# Patient Record
Sex: MALE | Race: WHITE | Employment: FULL TIME | URBAN - METROPOLITAN AREA
[De-identification: names, ages, dates, MRNs, and addresses within clinical notes are randomized per-mention and may not be internally consistent; named-entity substitution may affect disease eponyms.]

---

## 2023-11-21 ENCOUNTER — HOSPITAL ENCOUNTER (EMERGENCY)
Age: 38
Discharge: HOME | End: 2023-11-21
Attending: EMERGENCY MEDICINE
Payer: COMMERCIAL

## 2023-11-21 ENCOUNTER — APPOINTMENT (OUTPATIENT)
Dept: CT IMAGING | Age: 38
End: 2023-11-21
Payer: COMMERCIAL

## 2023-11-21 VITALS
OXYGEN SATURATION: 97 % | BODY MASS INDEX: 38.6 KG/M2 | HEART RATE: 69 BPM | WEIGHT: 285 LBS | SYSTOLIC BLOOD PRESSURE: 120 MMHG | RESPIRATION RATE: 18 BRPM | TEMPERATURE: 98.7 F | DIASTOLIC BLOOD PRESSURE: 90 MMHG | HEIGHT: 72 IN

## 2023-11-21 DIAGNOSIS — H53.8 BLURRED VISION, LEFT EYE: Primary | ICD-10-CM

## 2023-11-21 LAB
CHP ED QC CHECK: YES
GLUCOSE BLD-MCNC: 97 MG/DL
GLUCOSE BLD-MCNC: 97 MG/DL (ref 70–99)

## 2023-11-21 PROCEDURE — 70450 CT HEAD/BRAIN W/O DYE: CPT

## 2023-11-21 PROCEDURE — 99284 EMERGENCY DEPT VISIT MOD MDM: CPT

## 2023-11-21 PROCEDURE — 70480 CT ORBIT/EAR/FOSSA W/O DYE: CPT

## 2023-11-21 PROCEDURE — 82962 GLUCOSE BLOOD TEST: CPT

## 2023-11-21 PROCEDURE — 6370000000 HC RX 637 (ALT 250 FOR IP): Performed by: EMERGENCY MEDICINE

## 2023-11-21 RX ORDER — ONDANSETRON 4 MG/1
4 TABLET, FILM COATED ORAL EVERY 8 HOURS PRN
COMMUNITY
Start: 2023-09-27

## 2023-11-21 RX ORDER — TETRACAINE HYDROCHLORIDE 5 MG/ML
2 SOLUTION OPHTHALMIC ONCE
Status: COMPLETED | OUTPATIENT
Start: 2023-11-21 | End: 2023-11-21

## 2023-11-21 RX ORDER — LOSARTAN POTASSIUM 25 MG/1
25 TABLET ORAL DAILY
COMMUNITY
Start: 2023-11-02

## 2023-11-21 RX ORDER — ATORVASTATIN CALCIUM 40 MG/1
40 TABLET, FILM COATED ORAL NIGHTLY
COMMUNITY
Start: 2023-08-28

## 2023-11-21 RX ORDER — PROPRANOLOL HYDROCHLORIDE 20 MG/1
20 TABLET ORAL 2 TIMES DAILY
COMMUNITY
Start: 2023-08-28

## 2023-11-21 RX ADMIN — FLUORESCEIN SODIUM 1 MG: 1 STRIP OPHTHALMIC at 19:51

## 2023-11-21 RX ADMIN — TETRACAINE HYDROCHLORIDE 2 DROP: 5 SOLUTION OPHTHALMIC at 19:51

## 2023-11-21 ASSESSMENT — PAIN - FUNCTIONAL ASSESSMENT: PAIN_FUNCTIONAL_ASSESSMENT: NONE - DENIES PAIN

## 2023-11-21 ASSESSMENT — VISUAL ACUITY
OS: 20/40
OU: 20/13
OD: 20/13

## 2023-11-22 NOTE — ED NOTES
Pt verbalized understanding of discharge instructions and follow-up care, denies any questions or concerns at this time. No new prescriptions provided; pt encouraged to return to the ED for any new or worsening symptoms.      Satya Enrique RN  11/21/23 4960 - - -

## 2023-11-22 NOTE — DISCHARGE INSTRUCTIONS
Your CT scans today were nonacute. Please follow-up promptly with ophthalmology or optometry for further evaluation and management. If you develop any worsening or concerning symptoms, please seek immediate medical attention.

## 2023-11-22 NOTE — ED PROVIDER NOTES
recently. He is on Jayda Skiff as well as antihypertensives. He denies any other infectious symptoms. No cough or fevers. No nasal congestion. Denies any sensation of a curtain coming down over his or loss of peripheral vision. Not having any floaters. Denies any focal logical deficits. No changes in speech or hearing or trouble swallowing. No focal weakness or paresthesias. He reports initially going to urgent care for symptoms and was directed to come to the emergency department for further evaluation. He presents with elevated diastolic blood pressure. Vital signs otherwise unremarkable. He is afebrile. No tachycardia. Respirations are within normal limits. Ox saturations are in the high 90s on room air. Fingerstick blood sugar was 97. On gross exam eye appears overall unremarkable. Is no conjunctival injection. No chemosis. Extraocular movements intact without pain. Pupils are equal and reactive. Did obtain a CT head and CT orbits. Those are both unremarkable. Results discussed with patient. I did numb the eye with tetracaine and stained with fluorescein. Examined under slit lamp. I do not see any findings that seem consistent with something like a acute angle closure glaucoma. Does not seem apical symptoms consistent with on the left retina detachment. He has not had any visual field loss or monocular loss of vision. No peripheral vision loss is. Denies sensation of a curtain coming over his eye. I discussed with him that symptoms could be due to something like a small vitreous hemorrhage. Do recommend prompt follow-up with ophthalmology. He will follow-up with ophthalmology in the morning. He is given referral to ophthalmology. We did discuss strict return precautions for worsening concerning symptoms. He is discharged, ambulatory, stable condition, with strict return precautions. -  Patient seen and evaluated in the emergency department.   -  Triage and

## 2024-01-28 ENCOUNTER — HOSPITAL ENCOUNTER (EMERGENCY)
Age: 39
Discharge: HOME | End: 2024-01-28
Attending: EMERGENCY MEDICINE
Payer: COMMERCIAL

## 2024-01-28 ENCOUNTER — APPOINTMENT (OUTPATIENT)
Dept: CT IMAGING | Age: 39
End: 2024-01-28
Payer: COMMERCIAL

## 2024-01-28 VITALS
RESPIRATION RATE: 19 BRPM | TEMPERATURE: 97.9 F | OXYGEN SATURATION: 99 % | WEIGHT: 260 LBS | DIASTOLIC BLOOD PRESSURE: 84 MMHG | SYSTOLIC BLOOD PRESSURE: 138 MMHG | HEART RATE: 75 BPM | BODY MASS INDEX: 35.26 KG/M2

## 2024-01-28 DIAGNOSIS — R42 DIZZINESS: Primary | ICD-10-CM

## 2024-01-28 DIAGNOSIS — E04.1 THYROID NODULE: ICD-10-CM

## 2024-01-28 LAB
ANION GAP SERPL CALCULATED.3IONS-SCNC: 13 MMOL/L (ref 7–16)
BASOPHILS ABSOLUTE: 0.1 K/CU MM
BASOPHILS RELATIVE PERCENT: 0.4 % (ref 0–1)
BUN SERPL-MCNC: 11 MG/DL (ref 6–23)
CALCIUM SERPL-MCNC: 9.2 MG/DL (ref 8.3–10.6)
CHLORIDE BLD-SCNC: 102 MMOL/L (ref 99–110)
CO2: 26 MMOL/L (ref 21–32)
CREAT SERPL-MCNC: 0.9 MG/DL (ref 0.9–1.3)
DIFFERENTIAL TYPE: ABNORMAL
EOSINOPHILS ABSOLUTE: 0.3 K/CU MM
EOSINOPHILS RELATIVE PERCENT: 2.2 % (ref 0–3)
GFR SERPL CREATININE-BSD FRML MDRD: >60 ML/MIN/1.73M2
GLUCOSE SERPL-MCNC: 113 MG/DL (ref 70–99)
HCT VFR BLD CALC: 44.2 % (ref 42–52)
HEMOGLOBIN: 14.1 GM/DL (ref 13.5–18)
IMMATURE NEUTROPHIL %: 0.2 % (ref 0–0.43)
LYMPHOCYTES ABSOLUTE: 2.8 K/CU MM
LYMPHOCYTES RELATIVE PERCENT: 23 % (ref 24–44)
MCH RBC QN AUTO: 27.3 PG (ref 27–31)
MCHC RBC AUTO-ENTMCNC: 31.9 % (ref 32–36)
MCV RBC AUTO: 85.7 FL (ref 78–100)
MONOCYTES ABSOLUTE: 0.7 K/CU MM
MONOCYTES RELATIVE PERCENT: 5.5 % (ref 0–4)
PDW BLD-RTO: 13.8 % (ref 11.7–14.9)
PLATELET # BLD: 329 K/CU MM (ref 140–440)
PMV BLD AUTO: 10.3 FL (ref 7.5–11.1)
POTASSIUM SERPL-SCNC: 3.7 MMOL/L (ref 3.5–5.1)
RBC # BLD: 5.16 M/CU MM (ref 4.6–6.2)
SEGMENTED NEUTROPHILS ABSOLUTE COUNT: 8.3 K/CU MM
SEGMENTED NEUTROPHILS RELATIVE PERCENT: 68.7 % (ref 36–66)
SODIUM BLD-SCNC: 141 MMOL/L (ref 135–145)
TOTAL IMMATURE NEUTOROPHIL: 0.03 K/CU MM
WBC # BLD: 12 K/CU MM (ref 4–10.5)

## 2024-01-28 PROCEDURE — 85025 COMPLETE CBC W/AUTO DIFF WBC: CPT

## 2024-01-28 PROCEDURE — 99285 EMERGENCY DEPT VISIT HI MDM: CPT

## 2024-01-28 PROCEDURE — 2580000003 HC RX 258: Performed by: EMERGENCY MEDICINE

## 2024-01-28 PROCEDURE — 6360000004 HC RX CONTRAST MEDICATION: Performed by: EMERGENCY MEDICINE

## 2024-01-28 PROCEDURE — 70498 CT ANGIOGRAPHY NECK: CPT

## 2024-01-28 PROCEDURE — 6370000000 HC RX 637 (ALT 250 FOR IP): Performed by: EMERGENCY MEDICINE

## 2024-01-28 PROCEDURE — 93005 ELECTROCARDIOGRAM TRACING: CPT | Performed by: EMERGENCY MEDICINE

## 2024-01-28 PROCEDURE — 70450 CT HEAD/BRAIN W/O DYE: CPT

## 2024-01-28 PROCEDURE — 96360 HYDRATION IV INFUSION INIT: CPT

## 2024-01-28 PROCEDURE — 80048 BASIC METABOLIC PNL TOTAL CA: CPT

## 2024-01-28 RX ORDER — AZITHROMYCIN 250 MG/1
TABLET, FILM COATED ORAL
COMMUNITY
Start: 2023-09-26

## 2024-01-28 RX ORDER — MECLIZINE HCL 12.5 MG/1
25 TABLET ORAL ONCE
Status: COMPLETED | OUTPATIENT
Start: 2024-01-28 | End: 2024-01-28

## 2024-01-28 RX ORDER — LISINOPRIL 5 MG/1
5 TABLET ORAL DAILY
COMMUNITY
Start: 2023-02-06

## 2024-01-28 RX ORDER — 0.9 % SODIUM CHLORIDE 0.9 %
1000 INTRAVENOUS SOLUTION INTRAVENOUS ONCE
Status: COMPLETED | OUTPATIENT
Start: 2024-01-28 | End: 2024-01-28

## 2024-01-28 RX ORDER — SILDENAFIL 50 MG/1
50 TABLET, FILM COATED ORAL DAILY PRN
COMMUNITY
Start: 2022-03-23

## 2024-01-28 RX ADMIN — SODIUM CHLORIDE 1000 ML: 9 INJECTION, SOLUTION INTRAVENOUS at 22:02

## 2024-01-28 RX ADMIN — MECLIZINE 25 MG: 12.5 TABLET ORAL at 21:56

## 2024-01-28 RX ADMIN — IOPAMIDOL 100 ML: 755 INJECTION, SOLUTION INTRAVENOUS at 22:56

## 2024-01-29 LAB
EKG ATRIAL RATE: 72 BPM
EKG DIAGNOSIS: NORMAL
EKG P AXIS: 39 DEGREES
EKG P-R INTERVAL: 158 MS
EKG Q-T INTERVAL: 368 MS
EKG QRS DURATION: 92 MS
EKG QTC CALCULATION (BAZETT): 402 MS
EKG R AXIS: -10 DEGREES
EKG T AXIS: 26 DEGREES
EKG VENTRICULAR RATE: 72 BPM

## 2024-01-29 PROCEDURE — 93010 ELECTROCARDIOGRAM REPORT: CPT | Performed by: INTERNAL MEDICINE

## 2024-01-29 NOTE — ED PROVIDER NOTES
follow-up with primary care provider with respect to his dizziness as well as a thyroid nodule shown today.  Discharged in stable condition.  Return precautions provided.        Amount and/or Complexity of Data Reviewed  Clinical lab tests: reviewed  Decide to obtain previous medical records or to obtain history from someone other than the patient: yes       -  Patient seen and evaluated in the emergency department.  -  Triage and nursing notes reviewed and incorporated.  -  Old chart records reviewed and incorporated.  -  Work-up included:  See above      Appropriate PPE utilized as indicated for entire patient encounter?  Time of Disposition: See timeline      Independent Imaging Interpretation by me: None     EKG (if obtained): Please see above interpretation     Chronic conditions affecting care: None     Discussion with Other Profesionals : None       Social Determinants : None          I am the Primary Clinician of Record.    ?  New Prescriptions    No medications on file     FINAL IMPRESSION  1. Dizziness    2. Thyroid nodule        Electronically signed by: Kenny Elias DO, 1/28/2024         Kenny Elias DO  01/28/24 2412

## 2024-01-29 NOTE — ED NOTES
Pt verbalized understanding of discharge instructions and follow-up care, denies any questions or concerns at this time. No new prescriptions provided; pt encouraged to return to the ED for any new or worsening symptoms.

## 2024-04-21 ENCOUNTER — HOSPITAL ENCOUNTER (EMERGENCY)
Age: 39
Discharge: HOME | End: 2024-04-21
Attending: EMERGENCY MEDICINE
Payer: COMMERCIAL

## 2024-04-21 VITALS
WEIGHT: 240 LBS | TEMPERATURE: 98.3 F | HEART RATE: 61 BPM | BODY MASS INDEX: 32.51 KG/M2 | SYSTOLIC BLOOD PRESSURE: 137 MMHG | DIASTOLIC BLOOD PRESSURE: 79 MMHG | OXYGEN SATURATION: 98 % | HEIGHT: 72 IN | RESPIRATION RATE: 18 BRPM

## 2024-04-21 DIAGNOSIS — R03.0 ELEVATED BLOOD PRESSURE READING: Primary | ICD-10-CM

## 2024-04-21 LAB — TROPONIN, HIGH SENSITIVITY: <6 NG/L (ref 0–22)

## 2024-04-21 PROCEDURE — 93005 ELECTROCARDIOGRAM TRACING: CPT | Performed by: EMERGENCY MEDICINE

## 2024-04-21 PROCEDURE — 99284 EMERGENCY DEPT VISIT MOD MDM: CPT

## 2024-04-21 PROCEDURE — 84484 ASSAY OF TROPONIN QUANT: CPT

## 2024-04-21 RX ORDER — AMOXICILLIN AND CLAVULANATE POTASSIUM 875; 125 MG/1; MG/1
1 TABLET, FILM COATED ORAL EVERY 12 HOURS
COMMUNITY
Start: 2024-04-18 | End: 2024-04-25

## 2024-04-21 ASSESSMENT — PAIN - FUNCTIONAL ASSESSMENT: PAIN_FUNCTIONAL_ASSESSMENT: 0-10

## 2024-04-21 ASSESSMENT — PAIN SCALES - GENERAL: PAINLEVEL_OUTOF10: 5

## 2024-04-21 ASSESSMENT — PAIN DESCRIPTION - LOCATION: LOCATION: NECK

## 2024-04-22 LAB
EKG ATRIAL RATE: 62 BPM
EKG DIAGNOSIS: NORMAL
EKG P AXIS: 30 DEGREES
EKG P-R INTERVAL: 190 MS
EKG Q-T INTERVAL: 392 MS
EKG QRS DURATION: 96 MS
EKG QTC CALCULATION (BAZETT): 397 MS
EKG R AXIS: 23 DEGREES
EKG T AXIS: 46 DEGREES
EKG VENTRICULAR RATE: 62 BPM

## 2024-04-22 PROCEDURE — 93010 ELECTROCARDIOGRAM REPORT: CPT | Performed by: INTERNAL MEDICINE

## 2024-04-22 NOTE — ED PROVIDER NOTES
CHIEF COMPLAINT    Chief Complaint   Patient presents with    Hypertension     Pt concerned states his /110 and HR was in the 50's just pta. Pt had partial thyroidectomy Thursday.     HPI  Aleksey Escamilla is a 39 y.o. male who presents to the ED with concerns for elevated blood pressure and feeling generally unwell.  Patient states that he had partial thyroidectomy on 04/18 and since that time his heart rate has been running a little bit lower than usual in the 50s.  He went to a baseball game during the day today and states that towards the end of the baseball game he began to experience headache, fatigue, and some mild lightheadedness.  He returned home and subsequently checked his blood pressure and found it to be elevated at 160/110.  He states he has been compliant with his losartan therapy and took a as needed propranolol today and reported the emergency department for further evaluation.  He states that his headache is currently improved but located in the frontal region rated as mild without radiation.  The lightheadedness seems improved but he does still continue with fatigue.  Denies new chest pain, shortness of breath, nausea, vomiting.      REVIEW OF SYSTEMS  Constitutional: No fever, chills  Eye: No visual changes  HENT: No earache or sore throat.  Resp: No SOB or productive cough.  Cardio: No chest pain or palpitations.  GI: No abdominal pain, nausea, vomiting, constipation or diarrhea. No melena.  : No dysuria, urgency or frequency.  Endocrine: No heat intolerance, no cold intolerance, no polydipsia   Lymphatics: No adenopathy  Musculoskeletal: No new muscle aches or joint pain.  Neuro: Complains of headache  Psych: No homicidal or suicidal thoughts  Skin: No rash, No itching.  ?  ?  PAST MEDICAL HISTORY  Past Medical History:   Diagnosis Date    Diabetes mellitus (HCC)     Hyperlipidemia     Hypertension      FAMILY HISTORY  History reviewed. No pertinent family history.  SOCIAL HISTORY  Social

## 2025-03-29 ENCOUNTER — HOSPITAL ENCOUNTER (EMERGENCY)
Age: 40
Discharge: HOME | End: 2025-03-29
Attending: STUDENT IN AN ORGANIZED HEALTH CARE EDUCATION/TRAINING PROGRAM
Payer: COMMERCIAL

## 2025-03-29 VITALS
TEMPERATURE: 97.8 F | DIASTOLIC BLOOD PRESSURE: 83 MMHG | BODY MASS INDEX: 30.7 KG/M2 | WEIGHT: 260 LBS | SYSTOLIC BLOOD PRESSURE: 137 MMHG | OXYGEN SATURATION: 100 % | HEART RATE: 95 BPM | RESPIRATION RATE: 18 BRPM | HEIGHT: 77 IN

## 2025-03-29 DIAGNOSIS — R11.2 NAUSEA AND VOMITING, UNSPECIFIED VOMITING TYPE: Primary | ICD-10-CM

## 2025-03-29 DIAGNOSIS — R10.13 ABDOMINAL PAIN, EPIGASTRIC: ICD-10-CM

## 2025-03-29 LAB — GLUCOSE BLD-MCNC: 104 MG/DL (ref 74–99)

## 2025-03-29 PROCEDURE — 82947 ASSAY GLUCOSE BLOOD QUANT: CPT

## 2025-03-29 PROCEDURE — 99283 EMERGENCY DEPT VISIT LOW MDM: CPT

## 2025-03-29 PROCEDURE — 6370000000 HC RX 637 (ALT 250 FOR IP): Performed by: STUDENT IN AN ORGANIZED HEALTH CARE EDUCATION/TRAINING PROGRAM

## 2025-03-29 RX ORDER — ACETAMINOPHEN 500 MG
500 TABLET ORAL
Status: COMPLETED | OUTPATIENT
Start: 2025-03-29 | End: 2025-03-29

## 2025-03-29 RX ORDER — ONDANSETRON 4 MG/1
4 TABLET, ORALLY DISINTEGRATING ORAL 3 TIMES DAILY PRN
Qty: 21 TABLET | Refills: 0 | Status: SHIPPED | OUTPATIENT
Start: 2025-03-29

## 2025-03-29 RX ORDER — ONDANSETRON 4 MG/1
4 TABLET, ORALLY DISINTEGRATING ORAL ONCE
Status: COMPLETED | OUTPATIENT
Start: 2025-03-29 | End: 2025-03-29

## 2025-03-29 RX ORDER — FAMOTIDINE 20 MG/1
20 TABLET, FILM COATED ORAL ONCE
Status: COMPLETED | OUTPATIENT
Start: 2025-03-29 | End: 2025-03-29

## 2025-03-29 RX ORDER — FAMOTIDINE 20 MG/1
20 TABLET, FILM COATED ORAL 2 TIMES DAILY
Qty: 28 TABLET | Refills: 0 | Status: SHIPPED | OUTPATIENT
Start: 2025-03-29 | End: 2025-04-12

## 2025-03-29 RX ADMIN — ACETAMINOPHEN 500 MG: 500 TABLET ORAL at 17:48

## 2025-03-29 RX ADMIN — ONDANSETRON 4 MG: 4 TABLET, ORALLY DISINTEGRATING ORAL at 17:47

## 2025-03-29 RX ADMIN — FAMOTIDINE 20 MG: 20 TABLET ORAL at 17:48

## 2025-03-29 ASSESSMENT — PAIN SCALES - GENERAL: PAINLEVEL_OUTOF10: 6

## 2025-03-29 ASSESSMENT — PAIN - FUNCTIONAL ASSESSMENT
PAIN_FUNCTIONAL_ASSESSMENT: NONE - DENIES PAIN
PAIN_FUNCTIONAL_ASSESSMENT: 0-10

## 2025-03-29 NOTE — DISCHARGE INSTRUCTIONS
Aleksey: Here are some specific instructions about taking Tylenol and/or ibuprofen.       acetaminophen (Tylenol) is used for fever and pain.  It comes in regular strength (325 mg) and extra strength (500 mg).  I typically recommend two of the ES (500 mg) tablets every 4-6 hours.  However, do not take more than 3 total doses (3,000 mg) in a 24-hour period. Also, do not take if you have any liver problems.        ibuprofen (Motrin or Advil) is used for fever, pain, and/or inflammation.   It is considered an NSAID (Non-Steroidal Anti-Inflamatory Drug). They come in 200 mg tabs/capsules, and can be dosed two tablets (400 mg total) every 6-8 hours.               On rare occasion, NSAIDS can cause gastric (stomach) irritation such as gastritis, and even ulcers. So, avoid these if you develop upper abdominal discomfort or heartburn. Some people will take anti-ulcer medication (like Pepcid, Zantac, or Prilosec) and maybe TUMS, while taking NSAIDs to minimize any stomach irritation.

## 2025-03-29 NOTE — ED PROVIDER NOTES
Emergency Department Encounter    Patient: Aleksey Escamilla  MRN: [id number]  : 1985  Date of Evaluation: 3/29/2025  ED Provider:  Puneet Briceno DO    Triage Chief Complaint:   Vomiting (In with abd pain with n/v since last night, not able to keep much down)    [place]:  Aleksey Escamilla is a 39 y.o. male that presents with 2 days of nausea and vomiting, describes upper abdominal cramping as well.  No fevers nonbloody nonbilious vomitus.  No diarrhea.  Describes body aches and chills as well.  No known sick contacts.  No cough shortness of breath or chest pain.  Previous has a history of acid reflux.  He is a diabetic but is well-controlled, takes Mounjaro.  No lightheadedness or syncope.    MDM:    History from : Patient    Patient with normal vital signs no acute distress.  Nontender abdominal exam.  No previous history abdominal surgeries.  His glucose is 104 on POC check and given his previous well-controlled diabetes my suspicion for DKA is quite low.  I considered diagnostic labs and CT imaging but given his normal vital signs nontender abdominal exam and overall well appearance I do not think this is much use at this time.  Will trial symptomatic treatment first.  He was given antiemetics Pepcid and Tylenol.  Discussed oral hydration at home.  Recommended that if he is not improving with these interventions to return back for recheck otherwise follow-up with PCP next week.           Patient was given the following medications:  Medications   ondansetron (ZOFRAN-ODT) disintegrating tablet 4 mg (4 mg Oral Given 3/29/25 1747)   famotidine (PEPCID) tablet 20 mg (20 mg Oral Given 3/29/25 1748)   acetaminophen (TYLENOL) tablet 500 mg (500 mg Oral Given 3/29/25 1748)       Discharge condition: stable    I am the Primary Clinician of Record.    Is this patient to be included in the SEP-1 Core Measure due to severe sepsis or septic shock?   No   Exclusion criteria - the patient is NOT to be included for SEP-1 Core Measure